# Patient Record
Sex: FEMALE | Race: WHITE | NOT HISPANIC OR LATINO
[De-identification: names, ages, dates, MRNs, and addresses within clinical notes are randomized per-mention and may not be internally consistent; named-entity substitution may affect disease eponyms.]

---

## 2020-05-04 PROBLEM — Z00.00 ENCOUNTER FOR PREVENTIVE HEALTH EXAMINATION: Status: ACTIVE | Noted: 2020-05-04

## 2020-05-18 ENCOUNTER — APPOINTMENT (OUTPATIENT)
Dept: OBGYN | Facility: CLINIC | Age: 25
End: 2020-05-18

## 2020-10-22 ENCOUNTER — LABORATORY RESULT (OUTPATIENT)
Age: 25
End: 2020-10-22

## 2020-10-22 ENCOUNTER — APPOINTMENT (OUTPATIENT)
Dept: OBGYN | Facility: CLINIC | Age: 25
End: 2020-10-22
Payer: COMMERCIAL

## 2020-10-22 VITALS
DIASTOLIC BLOOD PRESSURE: 68 MMHG | HEIGHT: 64 IN | BODY MASS INDEX: 29.19 KG/M2 | SYSTOLIC BLOOD PRESSURE: 126 MMHG | WEIGHT: 171 LBS

## 2020-10-22 DIAGNOSIS — Z80.3 FAMILY HISTORY OF MALIGNANT NEOPLASM OF BREAST: ICD-10-CM

## 2020-10-22 DIAGNOSIS — Z78.9 OTHER SPECIFIED HEALTH STATUS: ICD-10-CM

## 2020-10-22 DIAGNOSIS — N94.3 PREMENSTRUAL TENSION SYNDROME: ICD-10-CM

## 2020-10-22 DIAGNOSIS — N94.10 UNSPECIFIED DYSPAREUNIA: ICD-10-CM

## 2020-10-22 DIAGNOSIS — N92.6 IRREGULAR MENSTRUATION, UNSPECIFIED: ICD-10-CM

## 2020-10-22 LAB
HCG UR QL: NEGATIVE
QUALITY CONTROL: YES

## 2020-10-22 PROCEDURE — 99385 PREV VISIT NEW AGE 18-39: CPT

## 2020-10-22 PROCEDURE — 81025 URINE PREGNANCY TEST: CPT

## 2020-10-22 PROCEDURE — 99072 ADDL SUPL MATRL&STAF TM PHE: CPT

## 2020-10-22 NOTE — HISTORY OF PRESENT ILLNESS
[Patient reported PAP Smear was normal] : Patient reported PAP Smear was normal [Menarche Age ____] : age at menarche was [unfilled] [Irregular Cycle Intervals] : are  irregular [N] : Patient does not use contraception [Y] : Patient is sexually active [TextBox_4] : 26 YO FEMALE,HERE FOR ANNUAL EXAM\par HER LAST ANNUAL EXAM WAS:  2015\par \par PREGNANCY HISTORY:  TOTAL   0   LIVE BIRTHS:      SAB:      IAB:\par \par SEXUALLY ACTIVE: yes \par LENGTH OF TIME IN RELATIONSHIP: since 2010\par \par MEDICAL HISTORY INCLUDES: healthy\par FAMILY HISTORY IS SIGNIFICANT FOR: neg except for mat gm withhx of breast cancer in her 50s\par \par LAST VISIT WITH PRIMARY DOCTOR: within the year\par LAST BLOOD WORK: 2020\par \par NUTRITIONAL INFO: balanced, ca rich food once daily.  To add mv with minerals to diet\par CALCIUM INTAKE:SUPPLEMENTS: no\par CORRECT USE OF CALCIUM SUPPLEMENTS WAS REVIEWED\par \par EXERCISES: 4-5 time a week\par  [PapSmeardate] : 2015 [LMPDate] : Oct 11 2020 [MensesFreq] : 24-25 [MensesLength] : 3-4 [MensesAmount] : day  2 saturates a tampon in 1-2 hours [PGHxTotal] : 0 [FreeTextEntry1] : pt is seeing reprod endo [facility], for she has not conceived in 5 years.

## 2020-10-22 NOTE — PHYSICAL EXAM
[Appropriately responsive] : appropriately responsive [Alert] : alert [No Acute Distress] : no acute distress [No Lymphadenopathy] : no lymphadenopathy [Regular Rate Rhythm] : regular rate rhythm [No Murmurs] : no murmurs [Clear to Auscultation B/L] : clear to auscultation bilaterally [Soft] : soft [Non-tender] : non-tender [Non-distended] : non-distended [No HSM] : No HSM [No Lesions] : no lesions [No Mass] : no mass [Oriented x3] : oriented x3 [Examination Of The Breasts] : a normal appearance [No Masses] : no breast masses were palpable [Labia Majora] : normal [Labia Minora] : normal [Normal] : normal [Uterine Adnexae] : normal [Labia Majora Erythema] : erythema [Labia Minora Erythema] : erythema [Discharge] : a  ~M vaginal discharge was present [Scant] : scant [White] : white [Cheesy] : cheesy [Foul Smelling] : not foul smelling

## 2020-10-22 NOTE — PLAN
[FreeTextEntry1] : Appearance of discharge is candida, though scant evidence\par Will await affirm results

## 2020-10-22 NOTE — REVIEW OF SYSTEMS
[Patient Intake Form Reviewed] : Patient intake form was reviewed [Abn Vaginal bleeding] : abnormal vaginal bleeding [Negative] : Heme/Lymph [FreeTextEntry4] : epistaxis [FreeTextEntry8] : dyspareunia; vulvovag itch, on the outside, feels like a cut, comes and goes. present for a month

## 2020-10-23 LAB
C TRACH RRNA SPEC QL NAA+PROBE: NOT DETECTED
N GONORRHOEA RRNA SPEC QL NAA+PROBE: NOT DETECTED
SOURCE TP AMPLIFICATION: NORMAL

## 2020-10-26 DIAGNOSIS — Z86.19 PERSONAL HISTORY OF OTHER INFECTIOUS AND PARASITIC DISEASES: ICD-10-CM

## 2020-10-26 LAB
CANDIDA VAG CYTO: DETECTED
G VAGINALIS+PREV SP MTYP VAG QL MICRO: NOT DETECTED
T VAGINALIS VAG QL WET PREP: NOT DETECTED

## 2020-10-29 LAB — CYTOLOGY CVX/VAG DOC THIN PREP: ABNORMAL

## 2020-12-23 PROBLEM — Z86.19 HISTORY OF CANDIDAL VULVOVAGINITIS: Status: RESOLVED | Noted: 2020-10-26 | Resolved: 2020-12-23

## 2021-06-29 ENCOUNTER — APPOINTMENT (OUTPATIENT)
Dept: OBGYN | Facility: CLINIC | Age: 26
End: 2021-06-29
Payer: COMMERCIAL

## 2021-06-29 VITALS
HEIGHT: 64 IN | TEMPERATURE: 98.1 F | WEIGHT: 171 LBS | SYSTOLIC BLOOD PRESSURE: 118 MMHG | DIASTOLIC BLOOD PRESSURE: 76 MMHG | BODY MASS INDEX: 29.19 KG/M2

## 2021-06-29 LAB
HCG UR QL: NEGATIVE
QUALITY CONTROL: YES

## 2021-06-29 PROCEDURE — 99214 OFFICE O/P EST MOD 30 MIN: CPT

## 2021-06-29 PROCEDURE — 99072 ADDL SUPL MATRL&STAF TM PHE: CPT

## 2021-06-29 PROCEDURE — 81025 URINE PREGNANCY TEST: CPT

## 2021-06-29 RX ORDER — FOLIC ACID 1 MG/1
1 TABLET ORAL DAILY
Qty: 90 | Refills: 3 | Status: ACTIVE | COMMUNITY
Start: 2021-06-29 | End: 1900-01-01

## 2021-06-29 NOTE — HISTORY OF PRESENT ILLNESS
[FreeTextEntry1] : 27 yo WF G0 LMP june 14 2021   \par \par c/o white vag d c and itching\par \par seeing infertililty specialist    did not get pregnant for past 7 yrs\par \par prenatal vits  folic acid and DHA prescribed\par \par blood work done w PCP recently\par \par pt referred for blood work for measles  rubella  etc.\par \par did not get covid vaccine\par \par advised covid vaccination   pt to discuss further w her infertility doctor\par \par pt also shown how to keep BBT calendar\par \par Pelvic exam:\par \par slight vulvar erythema\par \par slight white vag d c\par \par cultures obtained\par \par pt given terazol and mycolog creams\par \par return for annual exam\par

## 2021-07-06 LAB
C TRACH RRNA SPEC QL NAA+PROBE: NOT DETECTED
CANDIDA VAG CYTO: NOT DETECTED
G VAGINALIS+PREV SP MTYP VAG QL MICRO: NOT DETECTED
N GONORRHOEA RRNA SPEC QL NAA+PROBE: NOT DETECTED
SOURCE AMPLIFICATION: NORMAL
T VAGINALIS VAG QL WET PREP: NOT DETECTED

## 2021-07-06 RX ORDER — NYSTATIN AND TRIAMCINOLONE ACETONIDE 100000; 1 MG/G; MG/G
100000-0.1 CREAM TOPICAL TWICE DAILY
Qty: 1 | Refills: 5 | Status: DISCONTINUED | COMMUNITY
Start: 2021-06-29 | End: 2021-07-06

## 2021-10-28 ENCOUNTER — APPOINTMENT (OUTPATIENT)
Dept: OBGYN | Facility: CLINIC | Age: 26
End: 2021-10-28

## 2022-02-16 ENCOUNTER — NON-APPOINTMENT (OUTPATIENT)
Age: 27
End: 2022-02-16

## 2022-03-21 ENCOUNTER — APPOINTMENT (OUTPATIENT)
Dept: OBGYN | Facility: CLINIC | Age: 27
End: 2022-03-21
Payer: COMMERCIAL

## 2022-03-21 VITALS
DIASTOLIC BLOOD PRESSURE: 70 MMHG | BODY MASS INDEX: 27.83 KG/M2 | WEIGHT: 163 LBS | HEIGHT: 64 IN | SYSTOLIC BLOOD PRESSURE: 120 MMHG

## 2022-03-21 LAB
HCG UR QL: POSITIVE
QUALITY CONTROL: YES

## 2022-03-21 PROCEDURE — 99395 PREV VISIT EST AGE 18-39: CPT

## 2022-03-21 PROCEDURE — 81025 URINE PREGNANCY TEST: CPT

## 2022-03-21 NOTE — DISCUSSION/SUMMARY
[FreeTextEntry1] : 27 YO FEMALE,HERE FOR ANNUAL EXAM\par - STD BLOOD DECLINE\par RTC FOR Prenatal CARE IN 5 WKS\par ALL QUESTIONS ANSWERED\par DISCUSSED PRECAUTIONS AGAINST COVID19, INCLUDING MASK WEARING, SOCIAL DISTANCING AND HAND WASHING.\par NOT VACCINATED

## 2022-03-21 NOTE — PHYSICAL EXAM

## 2022-03-21 NOTE — HISTORY OF PRESENT ILLNESS
[FreeTextEntry1] : 27 YO FEMALE \par PT JUST FOUND OUT PREG\par HERE FOR ANNUAL EXAM\par HER LAST ANNUAL EXAM WAS \par LMP 2022\par GARDASIL :  NOT SURE\par SEXUALLY ACTIVE:yes male\par LENGTH OF TIME IN RELATIONSHIP:  exclusive \par MEDICAL HISTORY INCLUDES: NONE\par FAMILY HISTORY IS SIGNIFICANT FOR: PGM BREAST CA AGE 59 YRS\par NUTRITIONAL INFO: overall well balanced,ca rich food: 2 daily, WEIGHT BEARING Exercise

## 2022-03-21 NOTE — COUNSELING
[Nutrition/ Exercise/ Weight Management] : nutrition, exercise, weight management [Vitamins/Supplements] : vitamins/supplements [Bladder Hygiene] : bladder hygiene [Pregnancy Options] : pregnancy options [Vaccines] : vaccines

## 2022-03-28 LAB — CYTOLOGY CVX/VAG DOC THIN PREP: ABNORMAL

## 2022-03-30 ENCOUNTER — APPOINTMENT (OUTPATIENT)
Dept: ANTEPARTUM | Facility: CLINIC | Age: 27
End: 2022-03-30
Payer: COMMERCIAL

## 2022-03-30 ENCOUNTER — APPOINTMENT (OUTPATIENT)
Dept: OBGYN | Facility: CLINIC | Age: 27
End: 2022-03-30
Payer: COMMERCIAL

## 2022-03-30 ENCOUNTER — ASOB RESULT (OUTPATIENT)
Age: 27
End: 2022-03-30

## 2022-03-30 ENCOUNTER — NON-APPOINTMENT (OUTPATIENT)
Age: 27
End: 2022-03-30

## 2022-03-30 VITALS
SYSTOLIC BLOOD PRESSURE: 114 MMHG | BODY MASS INDEX: 27.83 KG/M2 | TEMPERATURE: 97.8 F | HEIGHT: 64 IN | DIASTOLIC BLOOD PRESSURE: 70 MMHG | WEIGHT: 163 LBS

## 2022-03-30 DIAGNOSIS — R10.9 OTHER SPECIFIED PREGNANCY RELATED CONDITIONS, UNSPECIFIED TRIMESTER: ICD-10-CM

## 2022-03-30 DIAGNOSIS — Z01.419 ENCOUNTER FOR GYNECOLOGICAL EXAMINATION (GENERAL) (ROUTINE) W/OUT ABNORMAL FINDINGS: ICD-10-CM

## 2022-03-30 DIAGNOSIS — O20.9 HEMORRHAGE IN EARLY PREGNANCY, UNSPECIFIED: ICD-10-CM

## 2022-03-30 DIAGNOSIS — Z87.42 PERSONAL HISTORY OF OTHER DISEASES OF THE FEMALE GENITAL TRACT: ICD-10-CM

## 2022-03-30 DIAGNOSIS — Z32.01 ENCOUNTER FOR PREGNANCY TEST, RESULT POSITIVE: ICD-10-CM

## 2022-03-30 DIAGNOSIS — O26.899 OTHER SPECIFIED PREGNANCY RELATED CONDITIONS, UNSPECIFIED TRIMESTER: ICD-10-CM

## 2022-03-30 DIAGNOSIS — L29.2 PRURITUS VULVAE: ICD-10-CM

## 2022-03-30 DIAGNOSIS — N91.2 AMENORRHEA, UNSPECIFIED: ICD-10-CM

## 2022-03-30 DIAGNOSIS — Z87.898 PERSONAL HISTORY OF OTHER SPECIFIED CONDITIONS: ICD-10-CM

## 2022-03-30 LAB
HCG UR QL: POSITIVE
QUALITY CONTROL: YES

## 2022-03-30 PROCEDURE — 81003 URINALYSIS AUTO W/O SCOPE: CPT | Mod: QW

## 2022-03-30 PROCEDURE — 36415 COLL VENOUS BLD VENIPUNCTURE: CPT

## 2022-03-30 PROCEDURE — 76817 TRANSVAGINAL US OBSTETRIC: CPT

## 2022-03-30 PROCEDURE — 81025 URINE PREGNANCY TEST: CPT

## 2022-03-30 PROCEDURE — 99214 OFFICE O/P EST MOD 30 MIN: CPT

## 2022-03-30 RX ORDER — NYSTATIN 100000 [USP'U]/G
100000 CREAM TOPICAL 4 TIMES DAILY
Qty: 1 | Refills: 0 | Status: DISCONTINUED | COMMUNITY
Start: 2021-07-06 | End: 2022-03-30

## 2022-03-30 RX ORDER — TRIAMCINOLONE ACETONIDE 1 MG/G
0.1 CREAM TOPICAL 4 TIMES DAILY
Qty: 1 | Refills: 0 | Status: DISCONTINUED | COMMUNITY
Start: 2021-07-06 | End: 2022-03-30

## 2022-03-30 RX ORDER — TERCONAZOLE 4 MG/G
0.4 CREAM VAGINAL
Qty: 1 | Refills: 0 | Status: DISCONTINUED | COMMUNITY
Start: 2020-10-26 | End: 2022-03-30

## 2022-03-30 RX ORDER — TERCONAZOLE 8 MG/G
0.8 CREAM VAGINAL DAILY
Qty: 1 | Refills: 1 | Status: DISCONTINUED | COMMUNITY
Start: 2021-06-29 | End: 2022-03-30

## 2022-03-31 ENCOUNTER — NON-APPOINTMENT (OUTPATIENT)
Age: 27
End: 2022-03-31

## 2022-03-31 LAB
ABO + RH PNL BLD: NORMAL
BILIRUB UR QL STRIP: NORMAL
BLD GP AB SCN SERPL QL: NORMAL
COLLECTION METHOD: NORMAL
GLUCOSE UR-MCNC: NORMAL
HCG UR QL: 0.2 EU/DL
HCG-TM SERPL-MCNC: 2657 MIU/ML
HGB UR QL STRIP.AUTO: ABNORMAL
KETONES UR-MCNC: NORMAL
LEUKOCYTE ESTERASE UR QL STRIP: NORMAL
NITRITE UR QL STRIP: NORMAL
PH UR STRIP: 7.5
PROGEST SERPL-MCNC: 1.3 NG/ML
PROT UR STRIP-MCNC: NORMAL
SP GR UR STRIP: 1.01

## 2022-04-01 ENCOUNTER — APPOINTMENT (OUTPATIENT)
Dept: OBGYN | Facility: CLINIC | Age: 27
End: 2022-04-01
Payer: COMMERCIAL

## 2022-04-01 ENCOUNTER — APPOINTMENT (OUTPATIENT)
Dept: ANTEPARTUM | Facility: CLINIC | Age: 27
End: 2022-04-01
Payer: COMMERCIAL

## 2022-04-01 ENCOUNTER — ASOB RESULT (OUTPATIENT)
Age: 27
End: 2022-04-01

## 2022-04-01 VITALS
BODY MASS INDEX: 27.83 KG/M2 | DIASTOLIC BLOOD PRESSURE: 64 MMHG | HEIGHT: 64 IN | SYSTOLIC BLOOD PRESSURE: 122 MMHG | WEIGHT: 163 LBS

## 2022-04-01 PROCEDURE — 99213 OFFICE O/P EST LOW 20 MIN: CPT

## 2022-04-01 PROCEDURE — 76817 TRANSVAGINAL US OBSTETRIC: CPT

## 2022-04-01 PROCEDURE — 36415 COLL VENOUS BLD VENIPUNCTURE: CPT

## 2022-04-01 NOTE — HISTORY OF PRESENT ILLNESS
[Y] : Patient is sexually active [N] : Patient denies prior pregnancies [No] : Patient does not have concerns regarding sex [Currently Active] : currently active [Menarche Age: ____] : age at menarche was [unfilled] [PapSmeardate] : 03/21/22 [TextBox_31] : NEG [GonorrheaDate] : 03/21/22 [TextBox_63] : NEG [ChlamydiaDate] : 03/21/22 [TextBox_68] : NEG [LMPDate] : 02/24/22 [FreeTextEntry1] : 02/24/22

## 2022-04-01 NOTE — DISCUSSION/SUMMARY
[FreeTextEntry1] : Discussed the risk of ectopic pregnancy given her positive pregnancy test and sono not revealing pregnancy.\par \par Reviewed the need for serum HCG tests today and in 2 days (4/1) until pregnancy is visible on sono.\par \par Discussed ectopic pregnancy precautions and call parameters. Advised that if she experiences any symptoms of ectopic pregnancy that she needs to go to the ER. Patient expressed understanding.\par \par Ordered amenorrhea panel, HCG, type and screen. \par \par Discussed causes of bleeding during pregnancy.\par \par Patient will return in 2 days.

## 2022-04-01 NOTE — END OF VISIT
[FreeTextEntry3] : I, Radha Santino, solely acted as a scribe for Dr. Gomez on 03/30/2022. All medical entries made by the Scribe were at my, Dr Gomez's, direction and personally dictated by me on 03/30/2022. I have reviewed the chart and agree that the record accurately reflects my personal performance of the history, physical exam, assessment, and plan. I have also personally directed, reviewed, and agreed with the chart.\par

## 2022-04-06 ENCOUNTER — APPOINTMENT (OUTPATIENT)
Dept: OBGYN | Facility: CLINIC | Age: 27
End: 2022-04-06

## 2022-04-15 ENCOUNTER — APPOINTMENT (OUTPATIENT)
Dept: ANTEPARTUM | Facility: CLINIC | Age: 27
End: 2022-04-15

## 2022-04-15 ENCOUNTER — APPOINTMENT (OUTPATIENT)
Dept: OBGYN | Facility: CLINIC | Age: 27
End: 2022-04-15

## 2022-04-27 ENCOUNTER — APPOINTMENT (OUTPATIENT)
Dept: OBGYN | Facility: CLINIC | Age: 27
End: 2022-04-27

## 2022-04-27 ENCOUNTER — APPOINTMENT (OUTPATIENT)
Dept: ANTEPARTUM | Facility: CLINIC | Age: 27
End: 2022-04-27

## 2022-04-27 ENCOUNTER — APPOINTMENT (OUTPATIENT)
Dept: OBGYN | Facility: CLINIC | Age: 27
End: 2022-04-27
Payer: COMMERCIAL

## 2022-04-27 VITALS
DIASTOLIC BLOOD PRESSURE: 62 MMHG | HEIGHT: 64 IN | BODY MASS INDEX: 28.55 KG/M2 | SYSTOLIC BLOOD PRESSURE: 112 MMHG | WEIGHT: 167.25 LBS

## 2022-04-27 DIAGNOSIS — O03.9 COMPLETE OR UNSPECIFIED SPONTANEOUS ABORTION W/OUT COMPLICATION: ICD-10-CM

## 2022-04-27 LAB
HCG SERPL-MCNC: 592 MIU/ML
HCG UR QL: NEGATIVE
QUALITY CONTROL: YES

## 2022-04-27 PROCEDURE — 36415 COLL VENOUS BLD VENIPUNCTURE: CPT

## 2022-04-27 PROCEDURE — 81025 URINE PREGNANCY TEST: CPT

## 2022-04-27 PROCEDURE — 99213 OFFICE O/P EST LOW 20 MIN: CPT

## 2022-04-27 RX ORDER — VITAMIN C, CALCIUM, IRON, VITAMIN D3, VITAMIN E, THIAMIN, RIBOFLAVIN, NIACINAMIDE, VITAMIN B6, FOLIC ACID, IODINE, ZINC, COPPER, DOCUSATE SODIUM 120; 85; 30; 3; 20; 20; 1; 25; 2; 50; 159; 4.54; 150; 5; 400; 3.4 MG/1; MG/1; [IU]/1; MG/1; MG/1; MG/1; MG/1; MG/1; MG/1; MG/1; MG/1; MG/1; UG/1; MG/1; [IU]/1; MG/1
90-1 & 300 TABLET ORAL
Qty: 90 | Refills: 3 | Status: DISCONTINUED | COMMUNITY
Start: 2021-06-29 | End: 2022-04-27

## 2022-04-27 NOTE — PHYSICAL EXAM
[Appropriately responsive] : appropriately responsive [Alert] : alert [No Acute Distress] : no acute distress [Soft] : soft [Non-tender] : non-tender [Non-distended] : non-distended [No Mass] : no mass [Oriented x3] : oriented x3 [Labia Majora] : normal [Labia Minora] : normal [Pink Rugae] : pink rugae [Normal] : normal [Tenderness] : nontender [Uterine Adnexae] : normal [FreeTextEntry4] : 5 cc of dark vaginal blood. [FreeTextEntry5] : SVE: closed/long.

## 2022-04-27 NOTE — DISCUSSION/SUMMARY
[FreeTextEntry1] : -Spontaneous - Rh positive.\par 1) Patient is doing well with no complaints. Benign abdominal exam.\par 2) UCG today: Negative.\par 3) B-HCG on 3/30: 2657 -> : 592. Results were discussed. Repeat B-HCG collected today.\par \par -Last annual GYN exam in 3/2022.\par \par -Follow up in 3/2023 for her annual GYN exam or PRN.\par \par All questions and concerns were discussed.

## 2022-04-27 NOTE — REVIEW OF SYSTEMS
[Negative] : Heme/Lymph [Abdominal Pain] : abdominal pain [Abn Vaginal bleeding] : abnormal vaginal bleeding

## 2022-04-27 NOTE — DISCUSSION/SUMMARY
[FreeTextEntry1] : -Pregnancy of unknown location- Likely SAB on pelvic sono today.\par 1) Pelvic sono on 3/30/22 was significant for +GS, however no YS or FP.\par 2) Pelvic sono today shows: No GS. EMS: 8 mm. Normal appearing bilateral ovaries. Results were discussed. Consistent with spontaneous .\par 3) CBC and B-HCG collected today. We discussed the need to trend B-HCG levels until negative. \par 4) Rh positive\par 5) Call parameters were discussed.\par \par -Follow up in 1 week.\par \par All questions and concerns were discussed.

## 2022-04-27 NOTE — HISTORY OF PRESENT ILLNESS
[N] : Patient does not use contraception [Y] : Positive pregnancy history [Menarche Age: ____] : age at menarche was [unfilled] [Currently Active] : currently active [Men] : men [Vaginal] : vaginal [No] : No [Patient refuses STI testing] : Patient refuses STI testing [PapSmeardate] : 03/21/2022 [TextBox_31] : NEGATIVE [GonorrheaDate] : 03/21/2022 [TextBox_63] : NEGATIVE [ChlamydiaDate] : 03/21/2022 [TextBox_68] : NEGATIVE [LMPDate] : 02/24/2022 [PGxTotal] : 1 [FreeTextEntry1] : 02/24/2022

## 2022-04-27 NOTE — HISTORY OF PRESENT ILLNESS
[N] : Patient does not use contraception [Y] : Patient is sexually active [Menarche Age: ____] : age at menarche was [unfilled] [Currently Active] : currently active [PapSmeardate] : 03/21/22 [TextBox_31] : NEG [GonorrheaDate] : 03/21/22 [TextBox_63] : NEG [ChlamydiaDate] : 03/21/22 [TextBox_68] : NEG [HPVDate] : 10/22/20 [TextBox_78] : NEG [LMPDate] : 02/24/22 [PGxTotal] : 1 [FreeTextEntry1] : 02/24/22

## 2022-06-03 LAB — HCG SERPL-MCNC: <1 MIU/ML

## 2022-08-04 ENCOUNTER — APPOINTMENT (OUTPATIENT)
Dept: OBGYN | Facility: CLINIC | Age: 27
End: 2022-08-04

## 2022-08-04 VITALS
DIASTOLIC BLOOD PRESSURE: 74 MMHG | BODY MASS INDEX: 28.17 KG/M2 | TEMPERATURE: 97.5 F | HEIGHT: 64 IN | WEIGHT: 165 LBS | SYSTOLIC BLOOD PRESSURE: 118 MMHG

## 2022-08-04 DIAGNOSIS — N76.0 ACUTE VAGINITIS: ICD-10-CM

## 2022-08-04 LAB
BILIRUB UR QL STRIP: NORMAL
GLUCOSE UR-MCNC: NORMAL
HCG UR QL: 0.2 EU/DL
HGB UR QL STRIP.AUTO: NORMAL
KETONES UR-MCNC: NORMAL
LEUKOCYTE ESTERASE UR QL STRIP: ABNORMAL
NITRITE UR QL STRIP: NORMAL
PH UR STRIP: 6
PROT UR STRIP-MCNC: NORMAL
SP GR UR STRIP: >=1.03

## 2022-08-04 PROCEDURE — 99213 OFFICE O/P EST LOW 20 MIN: CPT

## 2022-08-04 PROCEDURE — 81003 URINALYSIS AUTO W/O SCOPE: CPT | Mod: QW

## 2022-08-04 NOTE — PHYSICAL EXAM
[Chaperone Present] : A chaperone was present in the examining room during all aspects of the physical examination [FreeTextEntry1] : Alta KAUFFMAN [Appropriately responsive] : appropriately responsive [Alert] : alert [No Acute Distress] : no acute distress [Oriented x3] : oriented x3 [Labia Majora] : normal [Labia Majora Erythema] : erythema [Labia Minora] : normal [Labia Minora Erythema] : erythema [Discharge] : a  ~M vaginal discharge was present [Moderate] : moderate [White] : white [Cheesy] : cheesy [Normal] : normal [Uterine Adnexae] : normal

## 2022-08-04 NOTE — HISTORY OF PRESENT ILLNESS
[N] : Patient does not use contraception [Y] : Positive pregnancy history [Menarche Age: ____] : age at menarche was [unfilled] [No] : Patient does not have concerns regarding sex [Currently Active] : currently active [PapSmeardate] : 03/21/22 [TextBox_31] : NEG [GonorrheaDate] : 03/21/22 [TextBox_63] : NEG [ChlamydiaDate] : 03/21/22 [TextBox_68] : NEG [HPVDate] : 10/22/20 [TextBox_78] : NEG [LMPDate] : 07/01/22 [PGxTotal] : 1 [PGHxABSpont] : 1 [FreeTextEntry1] : 07/01/22

## 2022-08-04 NOTE — DISCUSSION/SUMMARY
[FreeTextEntry1] : Affirm and gonorrhea/chlamydia cultures taken today, will f/u results\par \par RX for terconazole sent to pharmacy, reviewed instructions with pt \par \par Reviewed vulvovaginal hygiene and changing out of wet bathing suits when possible \par \par FU as needed or for annual in March

## 2022-08-04 NOTE — REVIEW OF SYSTEMS
[Patient Intake Form Reviewed] : Patient intake form was reviewed [FreeTextEntry8] : vaginal discharge/irritation

## 2022-08-05 LAB
C TRACH RRNA SPEC QL NAA+PROBE: NOT DETECTED
N GONORRHOEA RRNA SPEC QL NAA+PROBE: NOT DETECTED
SOURCE AMPLIFICATION: NORMAL

## 2022-08-06 DIAGNOSIS — B96.89 ACUTE VAGINITIS: ICD-10-CM

## 2022-08-06 DIAGNOSIS — N76.0 ACUTE VAGINITIS: ICD-10-CM

## 2022-08-06 LAB
CANDIDA VAG CYTO: NOT DETECTED
G VAGINALIS+PREV SP MTYP VAG QL MICRO: DETECTED
T VAGINALIS VAG QL WET PREP: NOT DETECTED

## 2022-08-06 RX ORDER — TERCONAZOLE 8 MG/G
0.8 CREAM VAGINAL
Qty: 1 | Refills: 0 | Status: DISCONTINUED | COMMUNITY
Start: 2022-08-04 | End: 2022-08-06

## 2022-08-06 RX ORDER — METRONIDAZOLE 7.5 MG/G
0.75 GEL VAGINAL
Qty: 1 | Refills: 1 | Status: ACTIVE | COMMUNITY
Start: 2022-08-06 | End: 1900-01-01

## 2024-10-15 ENCOUNTER — APPOINTMENT (OUTPATIENT)
Dept: DERMATOLOGY | Facility: CLINIC | Age: 29
End: 2024-10-15
Payer: MEDICAID

## 2024-10-15 PROCEDURE — 99203 OFFICE O/P NEW LOW 30 MIN: CPT

## 2025-04-15 ENCOUNTER — APPOINTMENT (OUTPATIENT)
Dept: DERMATOLOGY | Facility: CLINIC | Age: 30
End: 2025-04-15